# Patient Record
Sex: FEMALE | Race: BLACK OR AFRICAN AMERICAN | NOT HISPANIC OR LATINO | Employment: FULL TIME | URBAN - METROPOLITAN AREA
[De-identification: names, ages, dates, MRNs, and addresses within clinical notes are randomized per-mention and may not be internally consistent; named-entity substitution may affect disease eponyms.]

---

## 2017-01-05 ENCOUNTER — TELEPHONE (OUTPATIENT)
Dept: OBSTETRICS AND GYNECOLOGY | Facility: CLINIC | Age: 27
End: 2017-01-05

## 2017-01-05 RX ORDER — NORGESTIMATE AND ETHINYL ESTRADIOL 0.25-0.035
1 KIT ORAL DAILY
Qty: 84 TABLET | Refills: 0 | Status: SHIPPED | OUTPATIENT
Start: 2017-01-05 | End: 2017-01-18 | Stop reason: SDUPTHER

## 2017-01-05 NOTE — TELEPHONE ENCOUNTER
----- Message from Molly Aguero sent at 1/5/2017  8:45 AM EST -----  Contact: PATIENT  PT SCHEDULED ANNUAL FOR 1/18/17.    ----- Message -----     From: Molly New MA     Sent: 1/5/2017   7:58 AM       To: Molly Aguero    Pt needs to schedule annual exam before we can refill [email or web address].jmr  ----- Message -----     From: Molly Ageuro     Sent: 1/4/2017   4:08 PM       To: Molly New MA    PT REQUESTING REFILL ON BC PILLS THROUGH [facility].  SHE HAS A NEW INSURANCE POLICY BUT HER PHARMACY IS THE SAME.  SHE HAS NOT RCV'D HER RX IN THE MAIL SO SHE THINKS WE NEED TO RESEND HER RX TO [facility].  HOWEVER, IT LOOKS LIKE SHE MAY BE DUE FOR AN EXAM. SEE RECS SCANNED INTO CHART.  PLEASE ADVISE.    PT #   [phone]

## 2017-01-18 ENCOUNTER — OFFICE VISIT (OUTPATIENT)
Dept: OBSTETRICS AND GYNECOLOGY | Facility: CLINIC | Age: 27
End: 2017-01-18

## 2017-01-18 VITALS
HEART RATE: 78 BPM | DIASTOLIC BLOOD PRESSURE: 56 MMHG | SYSTOLIC BLOOD PRESSURE: 104 MMHG | HEIGHT: 64 IN | WEIGHT: 137 LBS | BODY MASS INDEX: 23.39 KG/M2

## 2017-01-18 DIAGNOSIS — Z01.419 ENCOUNTER FOR GYNECOLOGICAL EXAMINATION WITHOUT ABNORMAL FINDING: ICD-10-CM

## 2017-01-18 DIAGNOSIS — Z01.419 PAP SMEAR, AS PART OF ROUTINE GYNECOLOGICAL EXAMINATION: Primary | ICD-10-CM

## 2017-01-18 DIAGNOSIS — Z11.3 SCREEN FOR STD (SEXUALLY TRANSMITTED DISEASE): ICD-10-CM

## 2017-01-18 PROCEDURE — 99395 PREV VISIT EST AGE 18-39: CPT | Performed by: OBSTETRICS & GYNECOLOGY

## 2017-01-18 RX ORDER — NORGESTIMATE AND ETHINYL ESTRADIOL 0.25-0.035
1 KIT ORAL DAILY
Qty: 84 TABLET | Refills: 4 | Status: SHIPPED | OUTPATIENT
Start: 2017-01-18 | End: 2017-02-13 | Stop reason: SDUPTHER

## 2017-01-18 NOTE — PROGRESS NOTES
"Felice Muro is a 26 y.o. female annual exam.    History of Present Illness    Patient is a 26 y.o. for annual exam. Patient is s/p HPV vaccine.    Health Maintenance   Topic Date Due   • TDAP/TD VACCINES (1 - Tdap) 08/17/2011   • INFLUENZA VACCINE  08/01/2016   • PAP SMEAR  04/21/2017       The following portions of the patient's history were reviewed and updated as appropriate: allergies, current medications, past family history, past medical history, past social history, past surgical history and problem list.    Review of Systems   Genitourinary:        See HPI   All other systems reviewed and are negative.      Vitals:    01/18/17 0918   BP: 104/56   Pulse: 78   Weight: 137 lb (62.1 kg)   Height: 63.5\" (161.3 cm)       Objective   Physical Exam   Constitutional: She is oriented to person, place, and time. She appears well-developed and well-nourished.   HENT:   Head: Normocephalic and atraumatic.   Eyes: Conjunctivae and EOM are normal.   Neck: Normal range of motion. Neck supple. No thyromegaly present.   Cardiovascular: Normal rate, regular rhythm and normal heart sounds.    Pulmonary/Chest: Effort normal and breath sounds normal. Right breast exhibits no mass, no nipple discharge and no tenderness. Left breast exhibits no mass, no nipple discharge and no tenderness.   Abdominal: Soft. Bowel sounds are normal. There is no hepatosplenomegaly. There is no tenderness. No hernia.   Genitourinary: Rectum normal, vagina normal and uterus normal. Rectal exam shows no external hemorrhoid. There is no rash, tenderness or lesion on the right labia. There is no rash, tenderness or lesion on the left labia. Uterus is not enlarged and not tender. Cervix exhibits no discharge. Right adnexum displays no mass and no tenderness. Left adnexum displays no mass and no tenderness. No tenderness in the vagina. No vaginal discharge found.   Musculoskeletal: Normal range of motion. She exhibits no edema. "   Lymphadenopathy:        Right: No inguinal adenopathy present.        Left: No inguinal adenopathy present.   Neurological: She is alert and oriented to person, place, and time.   Skin: Skin is warm and dry. No rash noted.   Psychiatric: She has a normal mood and affect.   Vitals reviewed.        Assessment/Plan   Latasha was seen today for gynecologic exam.    Diagnoses and all orders for this visit:    Pap smear, as part of routine gynecological examination  -     ThinPrep PAP reflex to HPV-Aptima if ASC-U with Chlamydia/GC (199325)    Screen for STD (sexually transmitted disease)  -     ThinPrep PAP reflex to HPV-Aptima if ASC-U with Chlamydia/GC (199325)    Encounter for gynecological examination without abnormal finding    Other orders  -     norgestimate-ethinyl estradiol (PREVIFEM) 0.25-35 MG-MCG per tablet; Take 1 tablet by mouth Daily for 84 days.                 Return in about 1 year (around 1/18/2018).

## 2017-01-18 NOTE — MR AVS SNAPSHOT
Latasha Bhaskar   1/18/2017 9:15 AM   Office Visit    Dept Phone:  [phone]   Encounter #:  [id number]    Provider:  Ellyn Amaro MD   Department:  [facility]                Your Full Care Plan              Where to Get Your Medications      These medications were sent to [facility] - [place], [place] - [address] AT [address] [facility] - [phone] [facility] [phone]   [address], [facility] [address]     Phone:  [phone]     norgestimate-ethinyl estradiol 0.25-35 MG-MCG per tablet            Your Updated Medication List          This list is accurate as of: 1/18/17  9:49 AM.  Always use your most recent med list.                norgestimate-ethinyl estradiol 0.25-35 MG-MCG per tablet   Commonly known as:  PREVIFEM   Take 1 tablet by mouth Daily for 84 days.               We Performed the Following     ThinPrep PAP reflex to HPV-Aptima if ASC-U with Chlamydia/GC (267126)       You Were Diagnosed With        Codes Comments    Pap smear, as part of routine gynecological examination    -  Primary ICD-10-CM: Z01.419  ICD-9-CM: V76.2     Screen for STD (sexually transmitted disease)     ICD-10-CM: Z11.3  ICD-9-CM: V74.5     Encounter for gynecological examination without abnormal finding     ICD-10-CM: Z01.419  ICD-9-CM: V72.31       Instructions     None    Patient Instructions History      Upcoming Appointments     Visit Type Date Time Department    [facility] 1/18/2017  9:15 AM [facility]      [facility] Signup     [facility] [employer] allows you to send messages to your doctor, view your test results, renew your prescriptions, schedule appointments, and more. To sign up, go to [employer] and click on the Sign Up Now link in the New User? box. Enter your [employer] Activation Code exactly as it appears below along with the last four digits of your Social Security Number and your  "Date of Birth () to complete the sign-up process. If you do not sign up before the expiration date, you must request a new code.    [facility] Activation Code: [email or web address]  Expires: 2017  5:41 AM    If you have questions, you can email [email or web address] or call [phone] to talk to our [facility] staff. Remember, [facility] is NOT to be used for urgent needs. For medical emergencies, dial 911.               Other Info from Your Visit           Allergies     No Known Allergies      Reason for Visit     Gynecologic Exam           Vital Signs     Blood Pressure Pulse Height Weight Last Menstrual Period Body Mass Index    104/56 78 63.5\" (161.3 cm) 137 lb (62.1 kg) 2016 (Approximate) 23.89 kg/m2    Smoking Status                   Never Smoker           Problems and Diagnoses Noted     Encounter for routine gynecological examination    Screening for cervical cancer    -  Primary    Screening for STDs (sexually transmitted diseases)            "

## 2017-01-20 LAB
C TRACH RRNA CVX QL NAA+PROBE: NEGATIVE
CONV .: NORMAL
CYTOLOGIST CVX/VAG CYTO: NORMAL
CYTOLOGY CVX/VAG DOC THIN PREP: NORMAL
DX ICD CODE: NORMAL
HIV 1 & 2 AB SER-IMP: NORMAL
Lab: NORMAL
N GONORRHOEA RRNA CVX QL NAA+PROBE: NEGATIVE
OTHER STN SPEC: NORMAL
PATH REPORT.FINAL DX SPEC: NORMAL
STAT OF ADQ CVX/VAG CYTO-IMP: NORMAL
T VAGINALIS RRNA SPEC QL NAA+PROBE: NEGATIVE

## 2017-02-13 ENCOUNTER — TELEPHONE (OUTPATIENT)
Dept: OBSTETRICS AND GYNECOLOGY | Facility: CLINIC | Age: 27
End: 2017-02-13

## 2017-02-13 RX ORDER — NORGESTIMATE AND ETHINYL ESTRADIOL 0.25-0.035
1 KIT ORAL DAILY
Qty: 84 TABLET | Refills: 4 | Status: SHIPPED | OUTPATIENT
Start: 2017-02-13 | End: 2017-05-08

## 2017-02-13 NOTE — TELEPHONE ENCOUNTER
----- Message from Karla Whitt sent at 2/13/2017 11:13 AM EST -----  Contact: PATIENT  Patients prescription was sent through [facility] and she no longer uses [facility] and is requesting RX be [facility] to [facility] is in [facility] pharmacy.     Prescription resent to [facility].

## 2018-03-28 ENCOUNTER — OFFICE VISIT (OUTPATIENT)
Dept: OBSTETRICS AND GYNECOLOGY | Facility: CLINIC | Age: 28
End: 2018-03-28

## 2018-03-28 VITALS
DIASTOLIC BLOOD PRESSURE: 59 MMHG | HEIGHT: 63 IN | HEART RATE: 64 BPM | SYSTOLIC BLOOD PRESSURE: 97 MMHG | BODY MASS INDEX: 23.92 KG/M2 | WEIGHT: 135 LBS

## 2018-03-28 DIAGNOSIS — Z01.419 VISIT FOR GYNECOLOGIC EXAMINATION: ICD-10-CM

## 2018-03-28 DIAGNOSIS — N89.8 VAGINAL DISCHARGE: ICD-10-CM

## 2018-03-28 DIAGNOSIS — R30.0 BURNING WITH URINATION: Primary | ICD-10-CM

## 2018-03-28 LAB
BILIRUB BLD-MCNC: NEGATIVE MG/DL
GLUCOSE UR STRIP-MCNC: NEGATIVE MG/DL
KETONES UR QL: NEGATIVE
LEUKOCYTE EST, POC: NEGATIVE
NITRITE UR-MCNC: NEGATIVE MG/ML
PH UR: 7 [PH] (ref 5–8)
PROT UR STRIP-MCNC: NEGATIVE MG/DL
RBC # UR STRIP: NEGATIVE /UL
SP GR UR: 1.01 (ref 1–1.03)
UROBILINOGEN UR QL: NORMAL

## 2018-03-28 PROCEDURE — 81003 URINALYSIS AUTO W/O SCOPE: CPT | Performed by: OBSTETRICS & GYNECOLOGY

## 2018-03-28 PROCEDURE — 99395 PREV VISIT EST AGE 18-39: CPT | Performed by: OBSTETRICS & GYNECOLOGY

## 2018-03-28 RX ORDER — NORGESTIMATE AND ETHINYL ESTRADIOL 0.25-0.035
1 KIT ORAL DAILY
Qty: 28 TABLET | Refills: 11 | Status: SHIPPED | OUTPATIENT
Start: 2018-03-28 | End: 2019-03-19 | Stop reason: SDUPTHER

## 2018-03-28 RX ORDER — NORGESTIMATE AND ETHINYL ESTRADIOL 0.25-0.035
1 KIT ORAL DAILY
COMMUNITY
End: 2018-03-28 | Stop reason: SDUPTHER

## 2018-03-28 NOTE — PROGRESS NOTES
"[facility]  [address], [address]  [place], [place] [address]  Phone: [phone] / Fax:  [phone]      2018    [facility] [place] [id number]    No Known Provider    Chief Complaint   Patient presents with   • Gynecologic Exam     Annual Exam, pt c/o burning with urination and vagina discharge.       Latasha Muro is here for annual gynecologic exam.  HPI - Patient requesting refill on OCP.  In addition, she has a one week history of vaginal discharge and burning with urination.  No known STD exposure.  No previous history of STD.    History reviewed. No pertinent past medical history.    History reviewed. No pertinent surgical history.    No Known Allergies    Social History     Social History   • Marital status: Unknown     Spouse name: N/A   • Number of children: N/A   • Years of education: N/A     Occupational History   • Not on file.     Social History Main Topics   • Smoking status: Never Smoker   • Smokeless tobacco: Never Used   • Alcohol use Yes      Comment: social   • Drug use: No   • Sexual activity: Yes     Partners: Male     Birth control/ protection: Pill     Other Topics Concern   • Not on file     Social History Narrative   • No narrative on file       Family History   Problem Relation Age of Onset   • Breast cancer Maternal Grandmother    • No Known Problems Father    • No Known Problems Mother        Patient's last menstrual period was 2018 (approximate).    OB History      Para Term  AB Living    0 0 0 0 0 0    SAB TAB Ectopic Molar Multiple Live Births    0 0 0 0 0 0          Vitals:    18 1405   BP: 97/59   Pulse: 64   Weight: 61.2 kg (135 lb)   Height: 160 cm (63\")       Physical Exam   Constitutional: She appears well-developed and well-nourished.   Genitourinary: Vagina normal and uterus normal. Pelvic exam was performed with patient supine. There is no tenderness or lesion on the right labia. There is no tenderness or lesion on " the left labia. No foreign body in the vagina. Right adnexum does not display mass and does not display tenderness. Left adnexum does not display mass and does not display tenderness.   Cervix is not nulliparous. Cervix does not exhibit motion tenderness.   HENT:   Right Ear: External ear normal.   Left Ear: External ear normal.   Nose: Nose normal.   Eyes: Conjunctivae are normal.   Neck: Normal range of motion. Neck supple. No thyromegaly present.   Cardiovascular: Normal rate, regular rhythm and normal heart sounds.    Pulmonary/Chest: Effort normal. She has no wheezes. She has no rales. Right breast exhibits no mass and no nipple discharge. Left breast exhibits no mass and no nipple discharge.   Abdominal: Soft. She exhibits no mass. There is no guarding.   Musculoskeletal: Normal range of motion. She exhibits no edema.   Neurological: She is alert. Coordination normal.   Skin: Skin is warm and dry.   Psychiatric: She has a normal mood and affect. Her behavior is normal. Judgment and thought content normal.   Vitals reviewed.      Latasha was seen today for gynecologic exam.    Diagnoses and all orders for this visit:    Burning with urination  -     POC Urinalysis Dipstick, Automated  -     Urine Culture - Urine, Urine, Clean Catch  -     Dipstick normal.  Await cultures.  No vulvar lesions.    Vaginal discharge  -     NuSwab VG+ - Swab, Vagina  -     Await cultures.    Visit for gynecologic examination  -     norgestimate-ethinyl estradiol (ORTHO-CYCLEN) 0.25-35 MG-MCG per tablet; Take 1 tablet by mouth Daily.  -     Discussed importance of regular screening and breast awareness.        Juan F Park MD

## 2018-03-31 LAB
BACTERIA UR CULT: NORMAL
BACTERIA UR CULT: NORMAL

## 2018-04-04 ENCOUNTER — TELEPHONE (OUTPATIENT)
Dept: OBSTETRICS AND GYNECOLOGY | Facility: CLINIC | Age: 28
End: 2018-04-04

## 2018-04-04 LAB
A VAGINAE DNA VAG QL NAA+PROBE: NORMAL SCORE
BVAB2 DNA VAG QL NAA+PROBE: NORMAL SCORE
C ALBICANS DNA VAG QL NAA+PROBE: NEGATIVE
C GLABRATA DNA VAG QL NAA+PROBE: NEGATIVE
C TRACH RRNA SPEC QL NAA+PROBE: NEGATIVE
MEGA1 DNA VAG QL NAA+PROBE: NORMAL SCORE
N GONORRHOEA RRNA SPEC QL NAA+PROBE: NEGATIVE
T VAGINALIS RRNA SPEC QL NAA+PROBE: NEGATIVE

## 2018-04-04 NOTE — TELEPHONE ENCOUNTER
----- Message from Juan F Park MD sent at 4/4/2018  9:58 AM EDT -----  LAW - Let her know that her cultures were normal.

## 2019-03-19 DIAGNOSIS — Z01.419 VISIT FOR GYNECOLOGIC EXAMINATION: ICD-10-CM

## 2019-03-19 NOTE — TELEPHONE ENCOUNTER
Graciela    Let her know that I refilled her birth control.  However, she is past due for annual and needs to schedule.    Xavier

## 2019-08-21 ENCOUNTER — OFFICE VISIT (OUTPATIENT)
Dept: OBSTETRICS AND GYNECOLOGY | Facility: CLINIC | Age: 29
End: 2019-08-21

## 2019-08-21 VITALS
BODY MASS INDEX: 23.74 KG/M2 | DIASTOLIC BLOOD PRESSURE: 74 MMHG | WEIGHT: 134 LBS | HEIGHT: 63 IN | SYSTOLIC BLOOD PRESSURE: 118 MMHG

## 2019-08-21 DIAGNOSIS — Z11.3 SCREENING EXAMINATION FOR STD (SEXUALLY TRANSMITTED DISEASE): Primary | ICD-10-CM

## 2019-08-21 DIAGNOSIS — Z11.4 ENCOUNTER FOR SCREENING FOR HIV: ICD-10-CM

## 2019-08-21 DIAGNOSIS — Z01.419 VISIT FOR GYNECOLOGIC EXAMINATION: ICD-10-CM

## 2019-08-21 PROCEDURE — 99395 PREV VISIT EST AGE 18-39: CPT | Performed by: OBSTETRICS & GYNECOLOGY

## 2019-08-21 RX ORDER — LEVONORGESTREL / ETHINYL ESTRADIOL AND ETHINYL ESTRADIOL 150-30(84)
1 KIT ORAL DAILY
Qty: 91 EACH | Refills: 3 | Status: SHIPPED | OUTPATIENT
Start: 2019-08-21 | End: 2019-09-18 | Stop reason: SDUPTHER

## 2019-08-21 NOTE — PROGRESS NOTES
"[facility]  [address], [address]  [place], [place] [address]  Phone: [phone] / Fax:  [phone]      2019    [address] [place] [address]    Provider, No Known    Chief Complaint   Patient presents with   • Gynecologic Exam     Annual Exam, last pap 1-18-17 nl. Patient is wanting to start on birth control. She is requesting STD screening.       Latasha Ann is here for annual gynecologic exam.  HPI - Patient with normal cycles and wanting to start contraception.  She has been on OCP in past but wants to start extended OCPs.  She requests STD check but denies any specific exposures.    History reviewed. No pertinent past medical history.    History reviewed. No pertinent surgical history.    No Known Allergies    Social History     Socioeconomic History   • Marital status: Unknown     Spouse name: Not on file   • Number of children: Not on file   • Years of education: Not on file   • Highest education level: Not on file   Tobacco Use   • Smoking status: Never Smoker   • Smokeless tobacco: Never Used   Substance and Sexual Activity   • Alcohol use: Yes     Comment: social   • Drug use: No   • Sexual activity: Yes     Partners: Male     Birth control/protection: None       Family History   Problem Relation Age of Onset   • Breast cancer Maternal Grandmother    • No Known Problems Father    • No Known Problems Mother        Patient's last menstrual period was 2019 (exact date).    OB History      Para Term  AB Living    0 0 0 0 0 0    SAB TAB Ectopic Molar Multiple Live Births    0 0 0 0 0 0          Vitals:    19 1426   BP: 118/74   Weight: 60.8 kg (134 lb)   Height: 160 cm (63\")       Physical Exam   Constitutional: She appears well-developed and well-nourished.   Genitourinary: Vagina normal and uterus normal. Pelvic exam was performed with patient supine. There is no tenderness or lesion on the right labia. There is no tenderness or lesion on the left " labia. Right adnexum does not display tenderness and does not display fullness. Left adnexum does not display tenderness and does not display fullness. Cervix does not exhibit motion tenderness or lesion.   HENT:   Right Ear: External ear normal.   Left Ear: External ear normal.   Nose: Nose normal.   Eyes: Conjunctivae are normal.   Neck: Normal range of motion. Neck supple. No thyromegaly present.   Cardiovascular: Normal rate, regular rhythm and normal heart sounds.   Pulmonary/Chest: Effort normal. No stridor. She has no wheezes. Right breast exhibits no mass and no nipple discharge. Left breast exhibits no mass and no nipple discharge.   Abdominal: Soft. There is no tenderness. There is no guarding.   Musculoskeletal: Normal range of motion. She exhibits no edema.   Neurological: She is alert. Coordination normal.   Skin: Skin is warm and dry.   Psychiatric: She has a normal mood and affect. Her behavior is normal. Judgment and thought content normal.   Vitals reviewed.      Latasha was seen today for gynecologic exam.    Diagnoses and all orders for this visit:    Screening examination for STD (sexually transmitted disease)  -     Chlamydia trachomatis, Neisseria gonorrhoeae, Trichomonas vaginalis, PCR - Swab, Vagina  -     HIV-1 / O / 2 Ag / Antibody 4th Generation  -     RPR    Visit for gynecologic examination        -     Start Seasonique.        -     Discussed importance of regular screening and breast awareness.    Encounter for screening for HIV  -     HIV-1 / O / 2 Ag / Antibody 4th Generation        Juan F Park MD

## 2019-08-23 ENCOUNTER — TELEPHONE (OUTPATIENT)
Dept: OBSTETRICS AND GYNECOLOGY | Facility: CLINIC | Age: 29
End: 2019-08-23

## 2019-08-23 LAB
C TRACH RRNA VAG QL NAA+PROBE: NEGATIVE
N GONORRHOEA RRNA VAG QL NAA+PROBE: NEGATIVE
T VAGINALIS RRNA VAG QL NAA+PROBE: NEGATIVE

## 2019-08-23 NOTE — TELEPHONE ENCOUNTER
Left message for patient to call back. 8-23-19/lw  ----- Message from Juan F Park MD sent at 8/23/2019  7:28 AM EDT -----  LAW - Let her know that her tests for Chlamydia, Gonorrhea and Trichomonas were negative.  Find out if she plans to draw her blood for HIV and syphilis testing.

## 2019-09-17 ENCOUNTER — TELEPHONE (OUTPATIENT)
Dept: OBSTETRICS AND GYNECOLOGY | Facility: CLINIC | Age: 29
End: 2019-09-17

## 2019-09-17 NOTE — TELEPHONE ENCOUNTER
Carolyn    Let her know that these were sent.    Thanks    Xavier        Patient now has to go through [facility]  Please and not to [facility]. Needs Rx for generic Seasonique sent . Pt Ph [phone]

## 2019-09-18 DIAGNOSIS — Z01.419 VISIT FOR GYNECOLOGIC EXAMINATION: ICD-10-CM

## 2019-09-18 RX ORDER — LEVONORGESTREL / ETHINYL ESTRADIOL AND ETHINYL ESTRADIOL 150-30(84)
1 KIT ORAL DAILY
Qty: 91 EACH | Refills: 3 | Status: SHIPPED | OUTPATIENT
Start: 2019-09-18 | End: 2020-08-24

## 2020-08-22 DIAGNOSIS — Z01.419 VISIT FOR GYNECOLOGIC EXAMINATION: ICD-10-CM

## 2020-08-24 RX ORDER — LEVONORGESTREL AND ETHINYL ESTRADIOL 150-30(84)
KIT ORAL
Qty: 90 EACH | Refills: 0 | Status: SHIPPED | OUTPATIENT
Start: 2020-08-24 | End: 2020-11-30

## 2020-11-11 DIAGNOSIS — Z01.419 VISIT FOR GYNECOLOGIC EXAMINATION: ICD-10-CM

## 2020-11-12 RX ORDER — LEVONORGESTREL AND ETHINYL ESTRADIOL 150-30(84)
KIT ORAL
Refills: 2 | OUTPATIENT
Start: 2020-11-12

## 2020-11-27 DIAGNOSIS — Z01.419 VISIT FOR GYNECOLOGIC EXAMINATION: ICD-10-CM

## 2020-11-30 ENCOUNTER — TELEPHONE (OUTPATIENT)
Dept: OBSTETRICS AND GYNECOLOGY | Facility: CLINIC | Age: 30
End: 2020-11-30

## 2020-11-30 RX ORDER — LEVONORGESTREL AND ETHINYL ESTRADIOL 150-30(84)
KIT ORAL
Qty: 91 EACH | Refills: 0 | Status: SHIPPED | OUTPATIENT
Start: 2020-11-30 | End: 2021-03-29 | Stop reason: HOSPADM

## 2021-03-27 DIAGNOSIS — Z01.419 VISIT FOR GYNECOLOGIC EXAMINATION: ICD-10-CM

## 2021-03-29 ENCOUNTER — TELEPHONE (OUTPATIENT)
Dept: OBSTETRICS AND GYNECOLOGY | Facility: CLINIC | Age: 31
End: 2021-03-29

## 2021-03-29 RX ORDER — LEVONORGESTREL / ETHINYL ESTRADIOL AND ETHINYL ESTRADIOL 150-30(84)
KIT ORAL
Refills: 2 | OUTPATIENT
Start: 2021-03-29

## 2021-03-29 NOTE — TELEPHONE ENCOUNTER
Jodi    Please call patient and let her know that she needs to be seen in order to get more refills on birth control.  Once she makes an appointment, you can send me a message back and I will refill her contraception.  Let the patient know that she has to be seen once a year to continue birth control.    Xavier

## 2021-04-13 ENCOUNTER — TELEPHONE (OUTPATIENT)
Dept: OBSTETRICS AND GYNECOLOGY | Facility: CLINIC | Age: 31
End: 2021-04-13

## 2021-04-13 RX ORDER — LEVONORGESTREL / ETHINYL ESTRADIOL AND ETHINYL ESTRADIOL 150-30(84)
KIT ORAL
Refills: 2 | OUTPATIENT
Start: 2021-04-13